# Patient Record
Sex: MALE | Race: WHITE
[De-identification: names, ages, dates, MRNs, and addresses within clinical notes are randomized per-mention and may not be internally consistent; named-entity substitution may affect disease eponyms.]

---

## 2017-04-12 ENCOUNTER — HOSPITAL ENCOUNTER (EMERGENCY)
Dept: HOSPITAL 72 - EMR | Age: 37
Discharge: HOME | End: 2017-04-12
Payer: MEDICAID

## 2017-04-12 VITALS — SYSTOLIC BLOOD PRESSURE: 117 MMHG | DIASTOLIC BLOOD PRESSURE: 85 MMHG

## 2017-04-12 VITALS — BODY MASS INDEX: 28.63 KG/M2 | WEIGHT: 200 LBS | HEIGHT: 70 IN

## 2017-04-12 DIAGNOSIS — B02.9: Primary | ICD-10-CM

## 2017-04-12 PROCEDURE — 99284 EMERGENCY DEPT VISIT MOD MDM: CPT

## 2017-04-13 NOTE — EMERGENCY ROOM REPORT
History of Present Illness


General


Chief Complaint:  Neck Pain


Source:  Patient





Present Illness


HPI


36YOM with no known medical problems walk-in with burning pain, rash to right 

side of neck, ear, and eye "redness." for "a few days." Denies fever/chills, 

headache, nausea/vomiting, neck pain/stiffness.  Denies eye blurry vision, 

decrease vision. States initially with severe pain to area, then rash breakout.

  Some of the rash had "bubbles" that he popped.  Denies ear pain, rash on nose.


Allergies:  


Coded Allergies:  


     PENICILLINS (Verified  Adverse Reaction, Intermediate, 8/8/16)





Patient History


Past Medical History:  none


Past Surgical History:  none


Pertinent Family History:  none


Social History:  Denies: alcohol use, drug use, smoking


Immunizations:  UTD


Reviewed Nursing Documentation:  PMH: Agreed, PSxH: Agreed





Nursing Documentation-PMH


Past Medical History:  No Stated History





Review of Systems


All Other Systems:  negative except mentioned in HPI





Physical Exam





Vital Signs








  Date Time  Temp Pulse Resp B/P Pulse Ox O2 Delivery O2 Flow Rate FiO2


 


4/12/17 21:37 97.9 81 20 117/85 97 Room Air  








Sp02 EP Interpretation:  reviewed, normal


General Appearance:  normal inspection, well appearing, no apparent distress, 

alert, GCS 15, non-toxic


Head:  normocephalic, atraumatic


Eyes:  bilateral eye EOMI, bilateral eye PERRL, bilateral eye other - Right eye

: no flour uptake or dendrites on blue lamp


ENT:  normal ENT inspection, hearing grossly normal, normal voice, other - No 

vesicles on TM; no vesicles on nose


Neck:  normal inspection, full range of motion, supple, no meningismus, no bony 

tend


Respiratory:  normal inspection, lungs clear, normal breath sounds, no 

respiratory distress, no retraction, no wheezing


Cardiovascular #1:  regular rate, rhythm, no edema


Gastrointestinal:  normal inspection, normal bowel sounds, non tender, soft, no 

guarding, no hernia


Genitourinary:  no CVA tenderness


Musculoskeletal:  normal inspection, back normal, normal range of motion, Ruben'

s Sign negative


Neurologic:  alert, oriented x3, responsive, CNs III-XII nml as tested, motor 

strength/tone normal


Psychiatric:  normal inspection, judgement/insight normal, mood/affect normal


Skin:  normal color, other - Multiple areas of vesicles on erythematous base 

along dermatome of right side of neck, extending to include right ear.





Medical Decision Making


Diagnostic Impression:  


 Primary Impression:  


 Herpes zoster


 Qualified Codes:  B02.9 - Zoster without complications


ER Course


Likely shingles given initial severe pain then rash, vesicles on erythematous 

base


Does not appear to have opthalmicus however right eye has injected conjunctiva


No vishal hunt


Rx Valtrex, Ibuprofen/T#3


Given Referral Optham for tomorrow to evaluate eye


He understands importance of Optham followup





Last Vital Signs








  Date Time  Temp Pulse Resp B/P Pulse Ox O2 Delivery O2 Flow Rate FiO2


 


4/12/17 22:30 97.9 81 20 117/85 97 Room Air  








Status:  improved


Disposition:  HOME, SELF-CARE


Condition:  Improved


Scripts


Acetaminophen With Codeine (T#3) (TYLENOL #3 TAB*) Y Tab


2 TAB ORAL Q6H Y for Severe pain for 7 Days, #30 TAB


   Prov: VASU BILLINGSLEY M.D.         4/12/17 


Ibuprofen* (MOTRIN*) 800 Mg Tablet


800 MG ORAL THREE TIMES A DAY for For Pain, #30 TAB 0 Refills


   Prov: VASU BILLINGSLEY M.D.         4/12/17 


Valacyclovir Hcl (VALACYCLOVIR) 1,000 Mg Tablet


1000 MG ORAL TID for 10 Days, #30 TAB


   Prov: VASU BILLINGSLEY M.D.         4/12/17


Referrals:  


[facility],REFERRING (PCP)


Patient Instructions:  Shingles, Easy-to-Read





Additional Instructions:  


- Take all the Valacyclovir for 10 days until finished


- Take ibuprofen every 8 hours for pain with food


- For severe pain, take Tylenol with Codeine





- Make appointment to see Opthamologist TOMORROW.  


- Raul Bradshaw, [phone] [place]


- Shashank Mistry, [phone]











VASU BILLINGSLEY M.D. Apr 13, 2017 01:33

## 2018-12-06 ENCOUNTER — HOSPITAL ENCOUNTER (EMERGENCY)
Dept: HOSPITAL 72 - EMR | Age: 38
Discharge: HOME | End: 2018-12-06
Payer: MEDICAID

## 2018-12-06 VITALS — DIASTOLIC BLOOD PRESSURE: 70 MMHG | SYSTOLIC BLOOD PRESSURE: 116 MMHG

## 2018-12-06 VITALS — WEIGHT: 190 LBS | BODY MASS INDEX: 27.2 KG/M2 | HEIGHT: 70 IN

## 2018-12-06 VITALS — DIASTOLIC BLOOD PRESSURE: 70 MMHG | SYSTOLIC BLOOD PRESSURE: 112 MMHG

## 2018-12-06 DIAGNOSIS — M54.2: ICD-10-CM

## 2018-12-06 DIAGNOSIS — Z88.0: ICD-10-CM
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
## 2018-12-06 DIAGNOSIS — M54.5: ICD-10-CM

## 2018-12-06 DIAGNOSIS — S09.90XA: Primary | ICD-10-CM
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
## 2018-12-06 DIAGNOSIS — M25.511: ICD-10-CM

## 2018-12-06 PROCEDURE — 72020 X-RAY EXAM OF SPINE 1 VIEW: CPT

## 2018-12-06 PROCEDURE — 99284 EMERGENCY DEPT VISIT MOD MDM: CPT

## 2018-12-06 PROCEDURE — 72040 X-RAY EXAM NECK SPINE 2-3 VW: CPT

## 2018-12-06 NOTE — EMERGENCY ROOM REPORT
History of Present Illness


General


Chief Complaint:  Multiple Trauma/Fall


Source:  Patient





Present Illness


HPI


38-year-old male patient presents the ER complaining of pain status post slip 

and fall x1 day. States was walking last night and slipped on "wet" ground 

floor and fell to the ground.  Reports that he slipped and landed on his low 

back, neck, right shoulder, head.  Denies loss of consciousness.  Denies 

vomiting or vision changes.  Reports took ibuprofen last night and earlier 

today for pain symptoms.  Reports pain on right side of his lower back is 

radiating up to his neck, states neck pain is radiating to his right shoulder 

and arm.  Reports pain with movement.  Denies fever, chest pain, shortness of 

breath, abdominal pain.  Reports pain with ambulation, denies bowel or bladder 

incontinence.  Denies pain rating down the legs.


Allergies:  


Coded Allergies:  


     PENICILLINS (Verified  Adverse Reaction, Intermediate, 8/8/16)





Patient History


Past Medical History:  see triage record


Reviewed Nursing Documentation:  PMH: Agreed; PSxH: Agreed





Nursing Documentation-PMH


Hx Cardiac Problems:  No


Hx Hypertension:  No


Hx Pacemaker:  No


Hx Asthma:  No


Hx COPD:  No


Hx Diabetes:  No


Hx Cancer:  No


Hx Gastrointestinal Problems:  No - kidney stone, shingles


Hx Dialysis:  No


History Of Psychiatric Problem:  No


Hx Neurological Problems:  No


Hx Cerebrovascular Accident:  No


Hx Seizures:  No





Review of Systems


All Other Systems:  negative except mentioned in HPI





Physical Exam





Vital Signs








  Date Time  Temp Pulse Resp B/P (MAP) Pulse Ox O2 Delivery O2 Flow Rate FiO2


 


12/6/18 16:08 98.2 78 16 112/70 96 Room Air  








Sp02 EP Interpretation:  reviewed, normal


General Appearance:  well appearing, no apparent distress, alert, GCS 15, non-

toxic


Head:  normocephalic, atraumatic, other - negative López sign, negative 

Raccoon eyes, negative skull depression, negative heamtoma, no scalp laceration


Eyes:  bilateral eye normal inspection, bilateral eye PERRL, bilateral eye EOMI


ENT:  hearing grossly normal, normal pharynx, no angioedema, normal voice, TMs 

+ canals normal, uvula midline, moist mucus membranes


Neck:  full range of motion, no bony tend


Respiratory:  lungs clear, normal breath sounds, no rhonchi, no respiratory 

distress, no accessory muscle use, no wheezing, speaking full sentences


Cardiovascular #1:  regular rate, rhythm, no edema


Genitourinary:  no CVA tenderness


Musculoskeletal:  back normal - no spinous processs tenderness or bony 

depression, digits/nails normal, gait/station normal, normal range of motion, 

other - NVI, cap refill <2seconds, AIN/PIN/radial nerve intact, no spinous 

process tenderness, no bony depression, negative sulus sign, negative raccoon 

eyes, , tender - right lumbosacral region, right lateral shoulder


Neurologic:  alert, oriented x3, responsive, CNs III-XII nml as tested, motor 

strength/tone normal, SLR negative, sensory intact, cerebellar normal, normal 

gait, speech normal


Psychiatric:  mood/affect normal


Skin:  no rash





Medical Decision Making


PA Attestation


Dr. Granados  is my supervising Physician whom patient management has been 

discussed with.


Diagnostic Impression:  


 Primary Impression:  


 Fall from ground level


 Additional Impression:  


 Head injury


ER Course


Pt. presents to the ED c/o neck, back, right shoulder, and head pain s/p slip 

and fall injury last night.





Ddx considered but are not limited to fracture, sprain, strain, contusion, 

dislocation, cauda equina, ICH, skull fracture.


No bowel or bladder incontinence, low suspicion for cauda equina.


Cranial nerves intact as tested, no focal neuro deficits, negative lópez sign, 

negative raccoon eyes, no loss of consciousness, no vomiting or vision changes, 

low suspicion for skull fracture or intracranial pathology, does not require CT 

head at this time per [place] CT head rule.





Vital signs: are WNL, pt. is afebrile





ER COURSE


Provided with pain medication muscle relaxant and lidocaine patch., 


No spinous process tenderness or bony depression, negative straight leg raise, 

full range of motion of neck, pain with forward bend in low back.





X-ray of the right shoulder shows no acute fracture per the preliminary 

reading. F/u with PCP for referral and to discuss need for MRI.


Discuss results with the patient.  


Provided patient with copy of results. 


Instructed patient to followup with PCP and discuss results of report with 

patient, discuss need for further treatment and referral.





x-ray of the cervical spine shows no acute fracture per the preliminary reading

, loss of lordotic curve, likely due to muscle spasm causing straightening.  

Will provide patient with muscle relaxant.


X-ray of the lumbar spine shows no acute fracture per the preliminary reading.





Patient instructed on RICE method: rest, ice, compression, elevation.


Patient instructed on rest, ice and heat.


Patient instructed to be WBAT





Contact information for orthopedic urgent care provided, follow-up with urgent 

care if unable to followup with primary care provider and get referral to 

orthopedic specialist.


Followup with primary care provider. Discuss referral to ortho/pain management/

PT as needed. Discuss further imaging with MRI/CT as needed.





Resting comfortably in bed, using right shoulder and arm to hold the phone to 

watch videos on phone.





DISCHARGE





At this time pt. is stable for d/c to home. Patient is resting comfortably, in 

no acute distress, nontoxic appearing, talking without difficulty.


Will provide printed patient care instructions, and any necessary prescriptions.


Patient instructed to follow with primary care provider in 3 - 5 days and to 

request further follow-up as needed.


Care plan and follow up instructions have been discussed with the patient prior 

to discharge.


Take medications as directed. 


Patient questions asked and answered.


Patient reports understanding and agreement to treatment plan.


ER precautions given, patient instructed to return to ER immediately for any 

new or worsening of symptoms.





- Please note that this Emergency Department Report was dictated using [employer] technology software, occasionally this can lead to 

erroneous entry secondary to interpretation by the dictation equipment.


Other X-Ray Diagnostic Results


Other X-Ray Diagnostic Results #1:  


   X-Ray ordered:  Right shoulder


   # of Views/Limited Vs Complete:  3 View


   Indication:  Pain


   EP Interpretation:  Yes


   PA Xray:  Interpretation reviewed, by supervising MD, and agrees with 

findings.


   Interpretation:  no dislocation, no soft tissue swelling, no fractures


   Impression:  No acute disease


   PA Scribe Text


Irineo Garcia PA-C


Other X-Ray Diagnostic Results #2:  


   X-Ray ordered:  Cervical spine


   Indication:  Pain


   EP Interpretation:  Yes


   PA Xray:  Interpretation reviewed, by supervising MD, and agrees with 

findings.


   Interpretation:  no dislocation, no soft tissue swelling, no fractures


   Impression:  No acute disease


   PA Scribe Text


Irineo Garcia PA-C


Other X-Ray Diagnostic Results #3:  


   X-Ray ordered:  Lumbar spine


   # of Views/Limited Vs Complete:  3 View


   Indication:  Pain


   EP Interpretation:  Yes


   PA Xray:  Interpretation reviewed, by supervising MD, and agrees with 

findings.


   Interpretation:  no dislocation, no soft tissue swelling, no fractures


   Impression:  No acute disease


   PA Scribe Text


Irineo Garcia PA-C





Last Vital Signs








  Date Time  Temp Pulse Resp B/P (MAP) Pulse Ox O2 Delivery O2 Flow Rate FiO2


 


12/6/18 16:08 98.2 78 16 112/70 96 Room Air  








Status:  improved


Disposition:  HOME, SELF-CARE


Condition:  Stable


Scripts


Acetaminophen* (TYLENOL EXTRA STRENGTH*) 500 Mg Tablet


500 MG ORAL Q8H PRN for Prn Headache/Temp > 101, #30 TAB 0 Refills


   Prov: Omid Garcia         12/6/18 


Methocarbamol* (ROBAXIN*) 500 Mg Tablet


500 MG PO TID, #21 TAB 0 Refills


   Prov: Omid Garcia         12/6/18 


Lidocaine (Lidocaine) 1 Each Adh..patch


5 % TP DAILY for 7 Days, #7 PATCH


   Prov: Omid Garcia         12/6/18


Patient Instructions:  Cervical Sprain, Easy-to-Read, Head Injury, Adult, Easy-

to-Read, Scapular Fracture





Additional Instructions:  


Patient instructed to follow up with primary care provider 1-3 days and discuss 

further referral to ortho/physical therapy/pain management and need for further 

imaging at that time.


Patient instructed on rest, ice and heat.


Apply ice to scalp for swelling and pain symptoms.


Do not take muscle relaxant prior to drinking, driving, or operating heavy 

machinery.


Take medications as directed. 


Patient questions asked and answered.


ER precautions given, patient instructed to return to ER immediately for any 

new or worsening of symptoms.





[facility]


[address] [facility] [address]


[facility], [address]


[phone]


[email or web address]





Follow up with primary care physician in 1 - 2 days. 


If you experience loss of consciousness, vision loss or intractable vomiting, 

return to ED immediately.


Avoid screen time.


Drink plenty of fluids.


Avoid alcohol/drug use, rest.











Omid Garcia Dec 6, 2018 16:39

## 2018-12-06 NOTE — DIAGNOSTIC IMAGING REPORT
Indication: Pain, status post fall

 

Technique: 3 views of the right shoulder

 

Comparison: none

 

Findings: No acute fractures. No dislocations. The joint spaces are preserved.

 

Impression: Negative

## 2018-12-07 NOTE — DIAGNOSTIC IMAGING REPORT
Indication: Pain, status post fall

 

Technique: 3 views of the cervical spine

 

Comparison: none

 

Findings: Bony alignment is normal. No prevertebral soft tissue swelling. No acute

fractures. No dislocations. The cervicothoracic junction is well visualized.

 

Impression: Negative

## 2018-12-07 NOTE — DIAGNOSTIC IMAGING REPORT
Indication: Pain, status post fall

 

Technique: 3 views of the lumbar spine

 

Comparison: None

 

Findings: Bony alignment is normal. Vertebral body heights are preserved. The disc

spaces are preserved. The pedicles are intact

 

Impression: Negative